# Patient Record
Sex: FEMALE | Race: WHITE | URBAN - METROPOLITAN AREA
[De-identification: names, ages, dates, MRNs, and addresses within clinical notes are randomized per-mention and may not be internally consistent; named-entity substitution may affect disease eponyms.]

---

## 2017-08-03 ENCOUNTER — OFFICE VISIT (OUTPATIENT)
Dept: FAMILY MEDICINE CLINIC | Age: 32
End: 2017-08-03

## 2017-08-03 VITALS
OXYGEN SATURATION: 96 % | HEIGHT: 64 IN | WEIGHT: 136 LBS | DIASTOLIC BLOOD PRESSURE: 80 MMHG | BODY MASS INDEX: 23.22 KG/M2 | RESPIRATION RATE: 20 BRPM | HEART RATE: 100 BPM | SYSTOLIC BLOOD PRESSURE: 130 MMHG | TEMPERATURE: 97.8 F

## 2017-08-03 DIAGNOSIS — Z11.3 SCREENING FOR STD (SEXUALLY TRANSMITTED DISEASE): ICD-10-CM

## 2017-08-03 DIAGNOSIS — R06.2 WHEEZING: ICD-10-CM

## 2017-08-03 DIAGNOSIS — Z00.00 ROUTINE ADULT HEALTH MAINTENANCE: ICD-10-CM

## 2017-08-03 DIAGNOSIS — Z30.41 ORAL CONTRACEPTIVE PILL SURVEILLANCE: Primary | ICD-10-CM

## 2017-08-03 RX ORDER — DESOGESTREL AND ETHINYL ESTRADIOL 0.15-0.03
KIT ORAL
COMMUNITY
Start: 2017-06-06 | End: 2017-08-08

## 2017-08-03 RX ORDER — ALBUTEROL SULFATE 90 UG/1
AEROSOL, METERED RESPIRATORY (INHALATION)
COMMUNITY
End: 2017-08-03 | Stop reason: SDUPTHER

## 2017-08-03 RX ORDER — TRETIONIN 0.1 MG/G
GEL TOPICAL
COMMUNITY
End: 2018-08-13 | Stop reason: SDUPTHER

## 2017-08-03 RX ORDER — ALBUTEROL SULFATE 90 UG/1
2 AEROSOL, METERED RESPIRATORY (INHALATION)
Qty: 1 INHALER | Refills: 1 | Status: SHIPPED | OUTPATIENT
Start: 2017-08-03 | End: 2018-08-13 | Stop reason: SDUPTHER

## 2017-08-03 NOTE — PROGRESS NOTES
ANNUAL PHYSICAL EXAMINATION    History of Present Illness  Prince Singh is a 28 y.o. female who presents today for management of    Chief Complaint   Patient presents with   [facility] Establish Care       Patient is here to establish care. She has takes tretinoin for acne. She takes OCP. She has been taking since she was 17. She denies any adverse effects and would like to continue with it. She has on and off wheezing that occurs randomly. PFTs normal. She takes albuterol as needed. LMP: 7/12/17    Past Medical History  Past Medical History:   Diagnosis Date    Acne     Wheezing         Surgical History  Past Surgical History:   Procedure Laterality Date    HX TONSILLECTOMY      HX WISDOM TEETH EXTRACTION          Current Medications  Current Outpatient Prescriptions   Medication Sig    JULEBER 0.15-0.03 mg tab     tretinoin (RETIN-A) 0.01 % topical gel Apply  to affected area nightly.  albuterol (PROVENTIL HFA, VENTOLIN HFA, PROAIR HFA) 90 mcg/actuation inhaler Take 2 Puffs by inhalation every four (4) hours as needed for Wheezing. No current facility-administered medications for this visit. Allergies/Drug Reactions  Allergies   Allergen Reactions    Percocet [Oxycodone-Acetaminophen] Nausea and Vomiting        Family History  Family History   Problem Relation Age of Onset    Asthma Mother     Asthma Brother     Diabetes Father     Heart Attack Father 39    Alzheimer Father     Alzheimer Paternal Grandfather         Social History  Social History     Social History    Marital status: UNKNOWN     Spouse name: N/A    Number of children: N/A    Years of education: N/A     Occupational History    Not on file.      Social History Main Topics    Smoking status: Never Smoker    Smokeless tobacco: Never Used    Alcohol use 2.4 oz/week     4 Glasses of wine per week    Drug use: No    Sexual activity: Yes     Partners: Male     Birth control/ protection: Pill     Other Topics Concern  Not on file     Social History Narrative    Works as a . Single       Health Maintenance   Topic Date Due    DTaP/Tdap/Td series (1 - Tdap) 09/25/2007    PAP AKA CERVICAL CYTOLOGY  09/25/2007    INFLUENZA AGE 9 TO ADULT  08/01/2017       There is no immunization history on file for this patient.     Review of Systems  General ROS: negative for - chills, fatigue or fever  Psychological ROS: negative for - anxiety or depression  Ophthalmic ROS: negative for - blurry vision, decreased vision or double vision  ENT ROS: negative  Allergy and Immunology ROS: negative  Hematological and Lymphatic ROS: negative  Endocrine ROS: negative  Respiratory ROS: no cough, shortness of breath, or wheezing  Cardiovascular ROS: no chest pain or dyspnea on exertion  Gastrointestinal ROS: no abdominal pain, change in bowel habits, or black or bloody stools  Genito-Urinary ROS: no dysuria, trouble voiding, or hematuria  Musculoskeletal ROS: negative  Neurological ROS: negative  Dermatological ROS: negative     Hearing Screening    125Hz 250Hz 500Hz 1000Hz 2000Hz 3000Hz 4000Hz 6000Hz 8000Hz   Right ear:   Pass Pass Pass  Pass     Left ear:   Pass Pass Pass  Pass        Visual Acuity Screening    Right eye Left eye Both eyes   Without correction: 20/15 20/20 20/13   With correction:          Physical Exam  Vital signs:   Vitals:    08/03/17 1132   BP: 130/80   Pulse: 100   Resp: 20   Temp: 97.8 °F (36.6 °C)   TempSrc: Oral   SpO2: 96%   Weight: 136 lb (61.7 kg)   Height: 5' 4\" (1.626 m)       General: alert, oriented, not in distress  Head: scalp normal, atraumatic  Eyes: pupils are equal and reactive, full and intact EOM's  Ears: patent ear canal, intact tympanic membrane  Nose: normal turbinates, no congestion or discharge  Lips/Mouth: moist lips and buccal mucosa, non-enlarged tonsils, pink throat  Neck: supple, no JVD, no lymphadenopathy, non-palpable thyroid  Chest/Lungs: clear breath sounds, no wheezing or crackles  Heart: normal rate, regular rhythm, no murmur  Abdomen: soft, non-distended, non-tender, normal bowel sounds, no organomegaly, no masses  Extremities: no focal deformities, no edema  Skin: no active skin lesions    Laboratory/Tests: Total cholesterol 214  HDL 67  Hab1c 4.7      Assessment/Plan:      ICD-10-CM ICD-9-CM    1. Oral contraceptive pill surveillance Z30.41 V25.41 CANCELED: AMB POC URINE PREGNANCY TEST, VISUAL COLOR COMPARISON   2. Wheezing R06.2 786.07 albuterol (PROVENTIL HFA, VENTOLIN HFA, PROAIR HFA) 90 mcg/actuation inhaler   3. Screening for STD (sexually transmitted disease) Z11.3 V74.5 HIV 1/2 AG/AB, 4TH GENERATION,W RFLX CONFIRM      HEPATITIS C AB      HSV 1 AND 2 ABS, IGM      T PALLIDUM SCREEN W/REFLEX   4. Routine adult health maintenance Z00.00 V70.0 CBC WITH AUTOMATED DIFF      METABOLIC PANEL, COMPREHENSIVE           Health Maintenance  Colon cancer: due at age 49  Dyslipidemia: will check FLP and CMP  Diabetes mellitus: will check FBG  Influenza vaccine: due in the fall  Pneumococcal vaccine: not indicated  Hep B vaccine: not indicated    Weight:  Body mass index is Estimated body mass index is 23.34 kg/(m^2) as calculated from the following:    Height as of this encounter: 5' 4\" (1.626 m). Weight as of this encounter: 136 lb (61.7 kg). .   Cervical cancer:  Pap smear due  Breast Cancer: Mammogram at age 37          Follow-up Disposition:  Return for well woman exam., OCP management      I have discussed the diagnosis with the patient and the intended plan as seen in the above orders. The patient has received an after-visit summary and questions were answered concerning future plans. I have discussed medication side effects and warnings with the patient as well. I have reviewed the plan of care with the patient, accepted their input and they are in agreement with the treatment goals.        Regina Goode MD  August 3, 2017

## 2017-08-03 NOTE — PROGRESS NOTES
1. Have you been to the ER, urgent care clinic since your last visit? Hospitalized since your last visit? No    2. Have you seen or consulted any other health care providers outside of the [facility] since your last visit? Include any pap smears or colon screening.  No

## 2017-08-03 NOTE — MR AVS SNAPSHOT
Visit Information Date & Time Provider Department Dept. Phone Encounter #  
 8/3/2017 11:15 AM Kimberly NadineRanda [phone] [id number] Follow-up Instructions Return for well woman exam. Upcoming Health Maintenance Date Due DTaP/Tdap/Td series (1 - Tdap) 9/25/2007 PAP AKA CERVICAL CYTOLOGY 9/25/2007 INFLUENZA AGE 9 TO ADULT 8/1/2017 Allergies as of 8/3/2017  Review Complete On: 8/3/2017 By: Kimberly Mccoy MD  
  
 Severity Noted Reaction Type Reactions Percocet [Oxycodone-acetaminophen]  08/03/2017    Nausea and Vomiting Current Immunizations  Never Reviewed No immunizations on file. Not reviewed this visit You Were Diagnosed With   
  
 Codes Comments Oral contraceptive pill surveillance    -  Primary ICD-10-CM: Z30.41 ICD-9-CM: V25.41 Wheezing     ICD-10-CM: R06.2 ICD-9-CM: 786.07 Screening for STD (sexually transmitted disease)     ICD-10-CM: Z11.3 ICD-9-CM: V74.5 Routine adult health maintenance     ICD-10-CM: Z00.00 ICD-9-CM: V70.0 Vitals BP Pulse Temp Resp Height(growth percentile) Weight(growth percentile) 130/80 100 97.8 °F (36.6 °C) (Oral) 20 5' 4\" (1.626 m) 136 lb (61.7 kg) LMP SpO2 BMI OB Status Smoking Status 07/12/2017 96% 23.34 kg/m2 Having regular periods Never Smoker BMI and BSA Data Body Mass Index Body Surface Area  
 23.34 kg/m 2 1.67 m 2 Preferred Pharmacy Pharmacy Name Phone [address] [phone] Your Updated Medication List  
  
   
This list is accurate as of: 8/3/17 11:59 AM.  Always use your most recent med list.  
  
  
  
  
 albuterol 90 mcg/actuation inhaler Commonly known as:  PROVENTIL HFA, VENTOLIN HFA, PROAIR HFA Take 2 Puffs by inhalation every four (4) hours as needed for Wheezing. JULEBER 0.15-0.03 mg Tab Generic drug:  desogestrel-ethinyl estradiol  
  
 tretinoin 0.01 % topical gel Commonly known as:  RETIN-A Apply  to affected area nightly. Prescriptions Sent to Pharmacy Refills  
 albuterol (PROVENTIL HFA, VENTOLIN HFA, PROAIR HFA) 90 mcg/actuation inhaler 1 Sig: Take 2 Puffs by inhalation every four (4) hours as needed for Wheezing. Class: Normal  
 [address] [place] [address] #: [phone] Route: Inhalation We Performed the Following AMB POC URINE PREGNANCY TEST, VISUAL COLOR COMPARISON [20048 CPT(R)] Follow-up Instructions Return for well woman exam. To-Do List   
 08/03/2017 Lab:  CBC WITH AUTOMATED DIFF   
  
 08/03/2017 Lab:  HEPATITIS C AB   
  
 08/03/2017 Lab:  HIV 1/2 AG/AB, 4TH GENERATION,W RFLX CONFIRM   
  
 08/03/2017 Lab:  HSV 1 AND 2 ABS, IGM   
  
 08/03/2017 Lab:  METABOLIC PANEL, COMPREHENSIVE   
  
 08/03/2017 Lab:  T PALLIDUM SCREEN W/REFLEX Introducing [facility]! Kiana Cowart introduces [employer] patient portal. Now you can access parts of your medical record, email your doctor's office, and request medication refills online. 1. In your internet browser, go to [email or web address] 2. Click on the First Time User? Click Here link in the Sign In box. You will see the New Member Sign Up page. 3. Enter your [employer] Access Code exactly as it appears below. You will not need to use this code after youve completed the sign-up process. If you do not sign up before the expiration date, you must request a new code. · [employer] Access Code: [email or web address] Expires: 11/1/2017 11:06 AM 
 
4. Enter the last four digits of your Social Security Number (xxxx) and Date of Birth (mm/dd/yyyy) as indicated and click Submit. You will be taken to the next sign-up page. 5. Create a [employer] ID.  This will be your [employer] login ID and cannot be changed, so think of one that is secure and easy to remember. 6. Create a [employer] password. You can change your password at any time. 7. Enter your Password Reset Question and Answer. This can be used at a later time if you forget your password. 8. Enter your e-mail address. You will receive e-mail notification when new information is available in [address] 9. Click Sign Up. You can now view and download portions of your medical record. 10. Click the Download Summary menu link to download a portable copy of your medical information. If you have questions, please visit the Frequently Asked Questions section of the [employer] website. Remember, [employer] is NOT to be used for urgent needs. For medical emergencies, dial 911. Now available from your iPhone and Android! Please provide this summary of care documentation to your next provider. Your primary care clinician is listed as Tracey John. If you have any questions after today's visit, please call [phone].

## 2017-08-08 ENCOUNTER — OFFICE VISIT (OUTPATIENT)
Dept: FAMILY MEDICINE CLINIC | Age: 32
End: 2017-08-08

## 2017-08-08 VITALS
WEIGHT: 136.4 LBS | HEIGHT: 64 IN | HEART RATE: 90 BPM | TEMPERATURE: 98 F | SYSTOLIC BLOOD PRESSURE: 140 MMHG | DIASTOLIC BLOOD PRESSURE: 87 MMHG | RESPIRATION RATE: 20 BRPM | OXYGEN SATURATION: 99 % | BODY MASS INDEX: 23.29 KG/M2

## 2017-08-08 DIAGNOSIS — Z30.41 ORAL CONTRACEPTIVE PILL SURVEILLANCE: Primary | ICD-10-CM

## 2017-08-08 DIAGNOSIS — Z01.419 WELL WOMAN EXAM WITH ROUTINE GYNECOLOGICAL EXAM: ICD-10-CM

## 2017-08-08 LAB
HCG URINE, QL. (POC): NEGATIVE
VALID INTERNAL CONTROL?: YES

## 2017-08-08 RX ORDER — DESOGESTREL AND ETHINYL ESTRADIOL 0.15-0.03
1 KIT ORAL DAILY
Qty: 3 DOSE PACK | Refills: 3 | Status: SHIPPED | OUTPATIENT
Start: 2017-08-08 | End: 2018-08-13 | Stop reason: SDUPTHER

## 2017-08-08 NOTE — PROGRESS NOTES
1. Have you been to the ER, urgent care clinic since your last visit? Hospitalized since your last visit? No    2. Have you seen or consulted any other health care providers outside of the [address] [place] [address] since your last visit? Include any pap smears or colon screening.  No

## 2017-08-08 NOTE — PATIENT INSTRUCTIONS
Learning About Pap Tests  What is a Pap test?  The Pap test (also called a Pap smear) is a screening test for cancer of the cervix, which is the lower part of the uterus that opens into the vagina. The test can help your doctor find early changes in the cells that could lead to cancer. During the test, the doctor or nurse will insert a tool called a speculum into your vagina. The speculum gently spreads apart the vaginal walls. That allows your doctor to see inside the vagina and the cervix. He or she uses a cotton swab or brush to collect cell samples from your cervix. Try to schedule the test when you're not having your period. To get ready for a Pap test, avoid douches, tampons, vaginal medicines, sprays, or powders for at least a day before you have the test.  When should you have a Pap test?  Women should start having Pap tests at age 24. If you are younger than 24 and are sexually active, it's still a good idea to have regular testing for sexually transmitted infections. · Women 21 to 30 should have Pap tests every 3 years. · Women 30 to 72 may continue to have a Pap test every 3 years as long as their results are normal. Or they can choose to have a Pap test and an HPV test. This is called co-testing. With co-testing, women can have screening every 5 years as long as their test results are normal.  · Women 72 and older may no longer need Pap tests. When to stop having Pap tests depends on your medical history, your overall health, and your risk of cervical cell changes or cervical cancer. Talk with your doctor about whether you should stop or continue to have Pap tests. He or she can help you decide. These recommendations do not apply to women who have had a serious abnormal Pap test result or who have certain health problems. Talk to your doctor about how often you should be tested. There is also a newer test called a primary HPV test. It is used in women ages 22 and older.  And it is done every 3 years, as long as a woman's test results are normal. A woman who has this test doesn't need to have a Pap test.  Having the HPV vaccine does not change your need for screening tests. Women who have had the HPV vaccine should follow the same screening schedules as women who have not had the vaccine. What happens after the test?  The sample of cells taken during your test will be sent to a lab so that an expert can look at the cells. It usually takes a week or two to get the results back. · A normal result means that the test did not find any abnormal cells in the sample. · An abnormal result can mean many things. Most of these are not cancer. The results of your test may be abnormal because:  ¨ You have an infection of the vagina or cervix, such as a yeast infection. ¨ You have an IUD (intrauterine device for birth control). ¨ You have low estrogen levels after menopause that are causing the cells to change. ¨ You have cell changes that may be a sign of precancer or cancer. The results are ranked based on how serious the changes might be. Where can you learn more? Go to http://alma-true.info/. Enter P919 in the search box to learn more about \"Learning About Pap Tests. \"  Current as of: July 26, 2016  Content Version: 11.3  © 5518-3682 [employer] Incorporated. Care instructions adapted under license by [employer] (which disclaims liability or warranty for this information). If you have questions about a medical condition or this instruction, always ask your healthcare professional. [place] [address] any warranty or liability for your use of this information.

## 2017-08-08 NOTE — MR AVS SNAPSHOT
Visit Information Date & Time Provider Department Dept. Phone Encounter #  
 8/8/2017  3:30 PM Elle Barrios, [address] [id number] Follow-up Instructions Return in about 1 year (around 8/8/2018), or as needed, for CPE . Upcoming Health Maintenance Date Due DTaP/Tdap/Td series (1 - Tdap) 9/25/2007 PAP AKA CERVICAL CYTOLOGY 9/25/2007 INFLUENZA AGE 9 TO ADULT 8/1/2017 Allergies as of 8/8/2017  Review Complete On: 8/8/2017 By: Elle Barrios MD  
  
 Severity Noted Reaction Type Reactions Percocet [Oxycodone-acetaminophen]  08/03/2017    Nausea and Vomiting Current Immunizations  Never Reviewed No immunizations on file. Not reviewed this visit You Were Diagnosed With   
  
 Codes Comments Oral contraceptive pill surveillance    -  Primary ICD-10-CM: Z30.41 ICD-9-CM: V25.41 Well woman exam with routine gynecological exam     ICD-10-CM: D14.825 ICD-9-CM: V72.31 Vitals BP Pulse Temp Resp Height(growth percentile) Weight(growth percentile) 140/87 90 98 °F (36.7 °C) (Oral) 20 5' 4\" (1.626 m) 136 lb 6.4 oz (61.9 kg) LMP SpO2 BMI OB Status Smoking Status 07/12/2017 99% 23.41 kg/m2 Having regular periods Never Smoker BMI and BSA Data Body Mass Index Body Surface Area  
 23.41 kg/m 2 1.67 m 2 [facility] [phone] Your Updated Medication List  
  
   
This list is accurate as of: 8/8/17  4:00 PM.  Always use your most recent med list.  
  
  
  
  
 albuterol 90 mcg/actuation inhaler Commonly known as:  PROVENTIL HFA, VENTOLIN HFA, PROAIR HFA Take 2 Puffs by inhalation every four (4) hours as needed for Wheezing. desogestrel-ethinyl estradiol 0.15-0.03 mg Tab Commonly known as:  Heidi Cockayne Take 1 Tab by mouth daily. tretinoin 0.01 % topical gel Commonly known as:  RETIN-A Apply  to affected area nightly. Prescriptions Sent to Pharmacy Refills  
 desogestrel-ethinyl estradiol (JULEBER) 0.15-0.03 mg tab 3 Sig: Take 1 Tab by mouth daily. Class: Normal  
 [facility] [address]  #: [phone] Route: Oral  
  
We Performed the Following AMB POC URINE PREGNANCY TEST, VISUAL COLOR COMPARISON [11228 CPT(R)] Follow-up Instructions Return in about 1 year (around 8/8/2018), or as needed, for CPE . To-Do List   
 08/08/2017 Pathology:  PAP IG, APTIMA HPV AND RFX 16/18,45 ([id number]) Patient Instructions Learning About Pap Tests What is a Pap test? 
The Pap test (also called a Pap smear) is a screening test for cancer of the cervix, which is the lower part of the uterus that opens into the vagina. The test can help your doctor find early changes in the cells that could lead to cancer. During the test, the doctor or nurse will insert a tool called a speculum into your vagina. The speculum gently spreads apart the vaginal walls. That allows your doctor to see inside the vagina and the cervix. He or she uses a cotton swab or brush to collect cell samples from your cervix. Try to schedule the test when you're not having your period. To get ready for a Pap test, avoid douches, tampons, vaginal medicines, sprays, or powders for at least a day before you have the test. 
When should you have a Pap test? 
Women should start having Pap tests at age 24. If you are younger than 24 and are sexually active, it's still a good idea to have regular testing for sexually transmitted infections. · Women 21 to 30 should have Pap tests every 3 years.  
· Women 30 to 72 may continue to have a Pap test every 3 years as long as their results are normal. Or they can choose to have a Pap test and an HPV test. This is called co-testing. With co-testing, women can have screening every 5 years as long as their test results are normal. 
· Women 72 and older may no longer need Pap tests. When to stop having Pap tests depends on your medical history, your overall health, and your risk of cervical cell changes or cervical cancer. Talk with your doctor about whether you should stop or continue to have Pap tests. He or she can help you decide. These recommendations do not apply to women who have had a serious abnormal Pap test result or who have certain health problems. Talk to your doctor about how often you should be tested. There is also a newer test called a primary HPV test. It is used in women ages 22 and older. And it is done every 3 years, as long as a woman's test results are normal. A woman who has this test doesn't need to have a Pap test. 
Having the HPV vaccine does not change your need for screening tests. Women who have had the HPV vaccine should follow the same screening schedules as women who have not had the vaccine. What happens after the test? 
The sample of cells taken during your test will be sent to a lab so that an expert can look at the cells. It usually takes a week or two to get the results back. · A normal result means that the test did not find any abnormal cells in the sample. · An abnormal result can mean many things. Most of these are not cancer. The results of your test may be abnormal because: 
¨ You have an infection of the vagina or cervix, such as a yeast infection. ¨ You have an IUD (intrauterine device for birth control). ¨ You have low estrogen levels after menopause that are causing the cells to change. ¨ You have cell changes that may be a sign of precancer or cancer. The results are ranked based on how serious the changes might be. Where can you learn more? Go to http://alma-true.info/. Enter P919 in the search box to learn more about \"Learning About Pap Tests. \" Current as of: July 26, 2016 Content Version: 11.3 © 1091-7161 [employer]orporated. Care instructions adapted under license by [employer] (which disclaims liability or warranty for this information). If you have questions about a medical condition or this instruction, always ask your healthcare professional. [address] any warranty or liability for your use of this information. Introducing [facility]! [employer] introduces [employer] patient portal. Now you can access parts of your medical record, email your doctor's office, and request medication refills online. 1. In your internet browser, go to [email or web address] 2. Click on the First Time User? Click Here link in the Sign In box. You will see the New Member Sign Up page. 3. Enter your [employer] Access Code exactly as it appears below. You will not need to use this code after youve completed the sign-up process. If you do not sign up before the expiration date, you must request a new code. · [employer] Access Code: [email or web address] Expires: 11/1/2017 11:06 AM 
 
4. Enter the last four digits of your Social Security Number (xxxx) and Date of Birth (mm/dd/yyyy) as indicated and click Submit. You will be taken to the next sign-up page. 5. Create a [employer] ID. This will be your [employer] login ID and cannot be changed, so think of one that is secure and easy to remember. 6. Create a [employer] password. You can change your password at any time. 7. Enter your Password Reset Question and Answer. This can be used at a later time if you forget your password. 8. Enter your e-mail address. You will receive e-mail notification when new information is available in [address] 9. Click Sign Up. You can now view and download portions of your medical record. 10. Click the Download Summary menu link to download a portable copy of your medical information. If you have questions, please visit the Frequently Asked Questions section of the [employer] website. Remember, [employer] is NOT to be used for urgent needs. For medical emergencies, dial 911. Now available from your iPhone and Android! Please provide this summary of care documentation to your next provider. Your primary care clinician is listed as Lisette Cabrales. If you have any questions after today's visit, please call [phone].

## 2017-08-08 NOTE — PROGRESS NOTES
History of Present Illness  Bright Strange is a 28 y.o. female who presents today for management of    Chief Complaint   Patient presents with    Well Woman    Contraception       Last LMP: 7/12/17  Patient denies any abnormal vaginal discharge, pain or bleeding. She is  sexually active with 1 partners. Last pap smear: 3 years ago  History of abnormal pap smear: yes, but last 2 pap smears normal    Problem List  Patient Active Problem List    Diagnosis Date Noted    Oral contraceptive pill surveillance 08/03/2017    Wheezing 08/03/2017       Past Medical History  Past Medical History:   Diagnosis Date    Acne     Wheezing         Surgical History  Past Surgical History:   Procedure Laterality Date    HX TONSILLECTOMY      HX WISDOM TEETH EXTRACTION          Current Medications  Current Outpatient Prescriptions   Medication Sig    desogestrel-ethinyl estradiol ([place]) 0.15-0.03 mg tab Take 1 Tab by mouth daily.  tretinoin (RETIN-A) 0.01 % topical gel Apply  to affected area nightly.  albuterol (PROVENTIL HFA, VENTOLIN HFA, PROAIR HFA) 90 mcg/actuation inhaler Take 2 Puffs by inhalation every four (4) hours as needed for Wheezing. No current facility-administered medications for this visit. Allergies/Drug Reactions  Allergies   Allergen Reactions    Percocet [Oxycodone-Acetaminophen] Nausea and Vomiting        Family History  Family History   Problem Relation Age of Onset    Asthma Mother     Asthma Brother     Diabetes Father     Heart Attack Father 39    Alzheimer Father     Alzheimer Paternal Grandfather         Social History  Social History     Social History    Marital status: UNKNOWN     Spouse name: N/A    Number of children: N/A    Years of education: N/A     Occupational History    Not on file.      Social History Main Topics    Smoking status: Never Smoker    Smokeless tobacco: Never Used    Alcohol use 2.4 oz/week     4 Glasses of wine per week    Drug use: No    Sexual activity: Yes     Partners: Male     Birth control/ protection: Pill     Other Topics Concern    Not on file     Social History Narrative    Works as a . Single       Review of Systems  General ROS: negative for - chills, fatigue or fever  Genito-Urinary ROS: negative for - change in menstrual cycle, dysmenorrhea, dyspareunia, irregular/heavy menses, pelvic pain or vulvar/vaginal symptoms      Physical Exam  Vital signs:   Vitals:    08/08/17 1540   BP: 140/87   Pulse: 90   Resp: 20   Temp: 98 °F (36.7 °C)   TempSrc: Oral   SpO2: 99%   Weight: 136 lb 6.4 oz (61.9 kg)   Height: 5' 4\" (1.626 m)       General: alert, oriented, not in distress  Breasts: right breast normal without mass, skin or nipple changes or axillary nodes, left breast normal without mass, skin or nipple changes or axillary nodes  Pelvic exam: VULVA: normal appearing vulva with no masses, tenderness or lesions, VAGINA: normal appearing vagina with normal color and discharge, no lesions, CERVIX: normal appearing cervix without discharge or lesions, UTERUS: uterus is normal size, shape, consistency and nontender, ADNEXA: nontender and no masses, BLADDER: nontender to palpation, no masses, URETHRAL MEATUS: no erythema or lesions present, PERINEUM: no evidence of trauma, no rashes or skin lesions present, ANUS: within normal limits, no hemorrhoids present      Assessment/Plan:          ICD-10-CM ICD-9-CM    1. Oral contraceptive pill surveillance Z30.41 V25.41 desogestrel-ethinyl estradiol (JULEBER) 0.15-0.03 mg tab      AMB POC URINE PREGNANCY TEST, VISUAL COLOR COMPARISON   2. Well woman exam with routine gynecological exam Z01.419 V72.31 PAP IG, APTIMA HPV AND RFX 16/18,45 ([id number])           Follow-up Disposition:  Return in about 1 year (around 8/8/2018), or as needed, for CPE . I have discussed the diagnosis with the patient and the intended plan as seen in the above orders.   The patient has received an after-visit summary and questions were answered concerning future plans. I have discussed medication side effects and warnings with the patient as well. I have reviewed the plan of care with the patient, accepted their input and they are in agreement with the treatment goals.        Kim Merrill MD  August 8, 2017

## 2017-08-09 LAB — PAP IMAGE GUIDED, 8900296: NORMAL

## 2017-08-15 LAB
MISCELLANEOUS TEST,99000: NORMAL
SENT TO, 434: NORMAL
TEST NAME, 435: NORMAL

## 2018-08-13 ENCOUNTER — OFFICE VISIT (OUTPATIENT)
Dept: FAMILY MEDICINE CLINIC | Age: 33
End: 2018-08-13

## 2018-08-13 VITALS
BODY MASS INDEX: 24.24 KG/M2 | SYSTOLIC BLOOD PRESSURE: 119 MMHG | TEMPERATURE: 99.5 F | HEART RATE: 96 BPM | OXYGEN SATURATION: 99 % | RESPIRATION RATE: 16 BRPM | DIASTOLIC BLOOD PRESSURE: 75 MMHG | WEIGHT: 142 LBS | HEIGHT: 64 IN

## 2018-08-13 DIAGNOSIS — R06.2 WHEEZING: ICD-10-CM

## 2018-08-13 DIAGNOSIS — Z00.00 ROUTINE GENERAL MEDICAL EXAMINATION AT A HEALTH CARE FACILITY: Primary | ICD-10-CM

## 2018-08-13 DIAGNOSIS — Z30.41 ORAL CONTRACEPTIVE PILL SURVEILLANCE: ICD-10-CM

## 2018-08-13 DIAGNOSIS — L70.0 ACNE VULGARIS: ICD-10-CM

## 2018-08-13 LAB
HCG URINE, QL. (POC): NEGATIVE
VALID INTERNAL CONTROL?: YES

## 2018-08-13 RX ORDER — TRETIONIN 0.1 MG/G
GEL TOPICAL
Qty: 45 G | Refills: 1 | Status: SHIPPED | OUTPATIENT
Start: 2018-08-13

## 2018-08-13 RX ORDER — DESOGESTREL AND ETHINYL ESTRADIOL 0.15-0.03
1 KIT ORAL DAILY
Qty: 3 DOSE PACK | Refills: 3 | Status: SHIPPED | OUTPATIENT
Start: 2018-08-13 | End: 2019-06-20 | Stop reason: SDUPTHER

## 2018-08-13 RX ORDER — ALBUTEROL SULFATE 90 UG/1
2 AEROSOL, METERED RESPIRATORY (INHALATION)
Qty: 1 INHALER | Refills: 1 | Status: SHIPPED | OUTPATIENT
Start: 2018-08-13 | End: 2020-06-29 | Stop reason: SDUPTHER

## 2018-08-13 NOTE — PROGRESS NOTES
Chief Complaint   Patient presents with    Well Woman     no pap     Medication Refill     1. Have you been to the ER, urgent care clinic since your last visit? Hospitalized since your last visit? No    2. Have you seen or consulted any other health care providers outside of the [address] [place] [address] since your last visit? Include any pap smears or colon screening.  No

## 2018-08-13 NOTE — MR AVS SNAPSHOT
[address] [place] [address] [phone] 
[phone] Patient: Patrizia Celestin 
MRN: [id number] [facility]:0/84/4703 Visit Information Date & Time Provider Department Dept. Phone Encounter #  
 8/13/2018 12:30 PM Patricia Plummer, [facility] [phone] [id number] Follow-up Instructions Return in about 1 year (around 8/13/2019), or as needed, for CPE. Upcoming Health Maintenance Date Due DTaP/Tdap/Td series (1 - Tdap) 9/25/2007 Influenza Age 5 to Adult 8/1/2018 PAP AKA CERVICAL CYTOLOGY 8/8/2020 Allergies as of 8/13/2018  Review Complete On: 8/13/2018 By: Patricia Plummer MD  
  
 Severity Noted Reaction Type Reactions Percocet [Oxycodone-acetaminophen]  08/03/2017    Nausea and Vomiting Current Immunizations  Never Reviewed No immunizations on file. Not reviewed this visit You Were Diagnosed With   
  
 Codes Comments Routine general medical examination at a health care facility    -  Primary ICD-10-CM: Z00.00 ICD-9-CM: V70.0 Oral contraceptive pill surveillance     ICD-10-CM: Z30.41 ICD-9-CM: V25.41 Wheezing     ICD-10-CM: R06.2 ICD-9-CM: 786.07 Acne vulgaris     ICD-10-CM: L70.0 ICD-9-CM: 706.1 Vitals BP Pulse Temp Resp Height(growth percentile) Weight(growth percentile) 119/75 (BP 1 Location: Right arm, BP Patient Position: At rest) 96 99.5 °F (37.5 °C) (Oral) 16 5' 4\" (1.626 m) 142 lb (64.4 kg) LMP SpO2 BMI OB Status Smoking Status 08/07/2018 99% 24.37 kg/m2 Having regular periods Never Smoker Vitals History BMI and BSA Data Body Mass Index Body Surface Area  
 24.37 kg/m 2 1.71 m 2 [facility] [phone] Your Updated Medication List  
  
   
 This list is accurate as of 8/13/18 12:42 PM.  Always use your most recent med list.  
  
  
  
  
 albuterol 90 mcg/actuation inhaler Commonly known as:  PROVENTIL HFA, VENTOLIN HFA, PROAIR HFA Take 2 Puffs by inhalation every four (4) hours as needed for Wheezing. desogestrel-ethinyl estradiol 0.15-0.03 mg Tab Commonly known as:  Margarie Muta Take 1 Tab by mouth daily. tretinoin 0.01 % topical gel Commonly known as:  RETIN-A Apply  to affected area nightly. Prescriptions Sent to Pharmacy Refills  
 desogestrel-ethinyl estradiol (JULEBER) 0.15-0.03 mg tab 3 Sig: Take 1 Tab by mouth daily. Class: Normal  
 Pharmacy: [place] [address] Ph #: [phone] Route: Oral  
 albuterol (PROVENTIL HFA, VENTOLIN HFA, PROAIR HFA) 90 mcg/actuation inhaler 1 Sig: Take 2 Puffs by inhalation every four (4) hours as needed for Wheezing. Class: Normal  
 Pharmacy: [place] [address] Ph #: [phone] Route: Inhalation  
 tretinoin (RETIN-A) 0.01 % topical gel 1 Sig: Apply  to affected area nightly. Class: Normal  
 Pharmacy: [place] [address] Ph #: [phone] Route: Topical  
  
We Performed the Following AMB POC URINE PREGNANCY TEST, VISUAL COLOR COMPARISON [12156 CPT(R)] Follow-up Instructions Return in about 1 year (around 8/13/2019), or as needed, for CPE. To-Do List   
 08/13/2018 Lab:  CBC WITH AUTOMATED DIFF   
  
 08/13/2018 Lab:  HEMOGLOBIN A1C WITH EAG   
  
 08/13/2018 Lab:  LIPID PANEL   
  
 08/13/2018 Lab:  METABOLIC PANEL, COMPREHENSIVE Around 08/13/2018 Lab:  URINALYSIS W/ RFLX MICROSCOPIC Introducing [facility]!    
 [address] introduces [employer] patient portal. Now you can access parts of your medical record, email your doctor's office, and request medication refills online. 1. In your internet browser, go to [email or web address] 2. Click on the First Time User? Click Here link in the Sign In box. You will see the New Member Sign Up page. 3. Enter your [employer] Access Code exactly as it appears below. You will not need to use this code after youve completed the sign-up process. If you do not sign up before the expiration date, you must request a new code. · [employer] Access Code: [email or web address] Expires: 11/11/2018 12:42 PM 
 
4. Enter the last four digits of your Social Security Number (xxxx) and Date of Birth (mm/dd/yyyy) as indicated and click Submit. You will be taken to the next sign-up page. 5. Create a [employer] ID. This will be your [employer] login ID and cannot be changed, so think of one that is secure and easy to remember. 6. Create a [employer] password. You can change your password at any time. 7. Enter your Password Reset Question and Answer. This can be used at a later time if you forget your password. 8. Enter your e-mail address. You will receive e-mail notification when new information is available in [address] 9. Click Sign Up. You can now view and download portions of your medical record. 10. Click the Download Summary menu link to download a portable copy of your medical information. If you have questions, please visit the Frequently Asked Questions section of the [employer] website. Remember, [employer] is NOT to be used for urgent needs. For medical emergencies, dial 911. Now available from your iPhone and Android! Please provide this summary of care documentation to your next provider. Your primary care clinician is listed as Rodney Mir. If you have any questions after today's visit, please call [phone].

## 2018-08-13 NOTE — PROGRESS NOTES
ANNUAL PHYSICAL EXAMINATION    History of Present Illness  Evan Brown is a 33 y.o. female who presents today for management of    Chief Complaint   Patient presents with    Well Woman     no pap     Medication Refill         Patient here for routine exam.  Current Complaints: needs refill of OCP. Gynecologic History  Patient's last menstrual period was Patient's last menstrual period was 2018. Oris Castellon Contraception: OCP (Oral Contraceptive Pills)  Last Pap: 2017  Results: normal    Obstetric History  : 0  Para: 0  AB: 0    Health Maintenance  Colon cancer: due at age 49  Dyslipidemia: due  Diabetes mellitus: due  Influenza vaccine: due in the fall  Pneumococcal vaccine: not indicated  Tdap: unknown  Herpes Zoster vaccine: due at age 62  Hep B vaccine: not indicated    Weight:  Body mass index is Estimated body mass index is Body mass index is 24.37 kg/(m^2). Oris Castellon Diet: no  Exercise: yes  Seatbelt: yes  Sunscreen: yes  Dentist: yes      Past Medical History  Past Medical History:   Diagnosis Date    Acne     Wheezing         Surgical History  Past Surgical History:   Procedure Laterality Date    HX TONSILLECTOMY      HX WISDOM TEETH EXTRACTION          Current Medications  Current Outpatient Prescriptions   Medication Sig    desogestrel-ethinyl estradiol (JULEBER) 0.15-0.03 mg tab Take 1 Tab by mouth daily.  albuterol (PROVENTIL HFA, VENTOLIN HFA, PROAIR HFA) 90 mcg/actuation inhaler Take 2 Puffs by inhalation every four (4) hours as needed for Wheezing.  tretinoin (RETIN-A) 0.01 % topical gel Apply  to affected area nightly. No current facility-administered medications for this visit.         Allergies/Drug Reactions  Allergies   Allergen Reactions    Percocet [Oxycodone-Acetaminophen] Nausea and Vomiting        Family History  Family History   Problem Relation Age of Onset    Asthma Mother     Asthma Brother     Diabetes Father     Heart Attack Father 39    Alzheimer Father     Alzheimer Paternal Grandfather         Social History  Social History     Social History    Marital status: SINGLE     Spouse name: N/A    Number of children: N/A    Years of education: N/A     Occupational History    Not on file. Social History Main Topics    Smoking status: Never Smoker    Smokeless tobacco: Never Used    Alcohol use 2.4 oz/week     4 Glasses of wine per week    Drug use: No    Sexual activity: Yes     Partners: Male     Birth control/ protection: Pill     Other Topics Concern    Not on file     Social History Narrative    Works as a . Single       Health Maintenance   Topic Date Due    DTaP/Tdap/Td series (1 - Tdap) 09/25/2007    Influenza Age 5 to Adult  08/01/2018    PAP AKA CERVICAL CYTOLOGY  08/08/2020       There is no immunization history on file for this patient.     Review of Systems  General ROS: negative for - chills, fatigue, fever, sleep disturbance, weight gain or weight loss  Psychological ROS: negative for - anxiety or depression  Ophthalmic ROS: negative  ENT ROS: negative  Allergy and Immunology ROS: negative  Hematological and Lymphatic ROS: negative  Endocrine ROS: negative for - breast changes, hair pattern changes or skin changes  Breast ROS: negative for breast lumps  Respiratory ROS: no cough, shortness of breath, or wheezing  Cardiovascular ROS: no chest pain or dyspnea on exertion  Gastrointestinal ROS: no abdominal pain, change in bowel habits, or black or bloody stools  Genito-Urinary ROS: no dysuria, trouble voiding, or hematuria  Musculoskeletal ROS: negative  Neurological ROS: negative for - confusion, dizziness, gait disturbance or impaired coordination/balance  Dermatological ROS: positive for - acne       Hearing Screening    125Hz 250Hz 500Hz 1000Hz 2000Hz 3000Hz 4000Hz 6000Hz 8000Hz   Right ear:   Pass Pass Pass  Pass     Left ear:   Pass Pass Pass  Pass        Visual Acuity Screening    Right eye Left eye Both eyes   Without correction: 20/13 20/13 20/13   With correction:            Physical Exam  Vital signs:   Vitals:    08/13/18 1224   BP: 119/75   Pulse: 96   Resp: 16   Temp: 99.5 °F (37.5 °C)   TempSrc: Oral   SpO2: 99%   Weight: 142 lb (64.4 kg)   Height: 5' 4\" (1.626 m)       General: alert, oriented, not in distress  Head: scalp normal, atraumatic  Eyes: pupils are equal and reactive, full and intact EOM's  Ears: patent ear canal, intact tympanic membrane  Nose: normal turbinates, no congestion or discharge  Lips/Mouth: moist lips and buccal mucosa, non-enlarged tonsils, pink throat  Neck: supple, no JVD, no lymphadenopathy, non-palpable thyroid  Chest/Lungs: clear breath sounds, no wheezing or crackles  Heart: normal rate, regular rhythm, no murmur  Abdomen: soft, non-distended, non-tender, normal bowel sounds, no organomegaly, no masses  Extremities: no focal deformities, no edema  Skin: no active skin lesions    Laboratory/Tests:    Routine labs ordered    Assessment/Plan:        ICD-10-CM ICD-9-CM    1. Routine general medical examination at a health care facility Z00.00 V70.0 CBC WITH AUTOMATED DIFF      HEMOGLOBIN A1C WITH EAG      LIPID PANEL      METABOLIC PANEL, COMPREHENSIVE      URINALYSIS W/ RFLX MICROSCOPIC      CBC WITH AUTOMATED DIFF      HEMOGLOBIN A1C WITH EAG      LIPID PANEL      METABOLIC PANEL, COMPREHENSIVE      URINALYSIS W/ RFLX MICROSCOPIC   2. Oral contraceptive pill surveillance Z30.41 V25.41 desogestrel-ethinyl estradiol (JULEBER) 0.15-0.03 mg tab      AMB POC URINE PREGNANCY TEST, VISUAL COLOR COMPARISON   3. Wheezing R06.2 786.07 albuterol (PROVENTIL HFA, VENTOLIN HFA, PROAIR HFA) 90 mcg/actuation inhaler   4. Acne vulgaris L70.0 706.1 tretinoin (RETIN-A) 0.01 % topical gel       continue current medications, continue current healthy lifestyle patterns and return for routine annual checkups  Insurance health assessment form will be completed for pick-up.     Follow-up Disposition:  Return in about 1 year (around 8/13/2019), or as needed, for CPE. I have discussed the diagnosis with the patient and the intended plan as seen in the above orders. The patient has received an after-visit summary and questions were answered concerning future plans. I have discussed medication side effects and warnings with the patient as well. I have reviewed the plan of care with the patient, accepted their input and they are in agreement with the treatment goals.        Marco A eBnitez MD  August 13, 2018

## 2018-08-14 ENCOUNTER — DOCUMENTATION ONLY (OUTPATIENT)
Dept: FAMILY MEDICINE CLINIC | Age: 33
End: 2018-08-14

## 2018-08-14 LAB
A-G RATIO,AGRAT: 1.6 RATIO (ref 1.1–2.6)
ABSOLUTE LYMPHOCYTE COUNT, 10803: 3.2 K/UL (ref 1–4.8)
ALBUMIN SERPL-MCNC: 4.3 G/DL (ref 3.5–5)
ALP SERPL-CCNC: 35 U/L (ref 25–115)
ALT SERPL-CCNC: 10 U/L (ref 5–40)
ANION GAP SERPL CALC-SCNC: 16 MMOL/L
AST SERPL W P-5'-P-CCNC: 13 U/L (ref 10–37)
AVG GLU, 10930: 83 MG/DL (ref 91–123)
BASOPHILS # BLD: 0 K/UL (ref 0–0.2)
BASOPHILS NFR BLD: 0 % (ref 0–2)
BILIRUB SERPL-MCNC: 0.5 MG/DL (ref 0.2–1.2)
BILIRUB UR QL: NEGATIVE
BUN SERPL-MCNC: 9 MG/DL (ref 6–22)
CALCIUM SERPL-MCNC: 9.2 MG/DL (ref 8.4–10.5)
CHLORIDE SERPL-SCNC: 99 MMOL/L (ref 98–110)
CHOLEST SERPL-MCNC: 200 MG/DL (ref 110–200)
CO2 SERPL-SCNC: 24 MMOL/L (ref 20–32)
CREAT SERPL-MCNC: 0.6 MG/DL (ref 0.5–1.2)
EOSINOPHIL # BLD: 0.3 K/UL (ref 0–0.5)
EOSINOPHIL NFR BLD: 3 % (ref 0–6)
ERYTHROCYTE [DISTWIDTH] IN BLOOD BY AUTOMATED COUNT: 13.4 % (ref 10–15.5)
GFRAA, 66117: >60
GFRNA, 66118: >60
GLOBULIN,GLOB: 2.7 G/DL (ref 2–4)
GLUCOSE SERPL-MCNC: 83 MG/DL (ref 70–99)
GLUCOSE UR QL: NEGATIVE MG/DL
GRANULOCYTES,GRANS: 48 % (ref 40–75)
HBA1C MFR BLD HPLC: 4.5 % (ref 4.8–5.9)
HCT VFR BLD AUTO: 42.5 % (ref 35.1–46.5)
HDLC SERPL-MCNC: 3.8 MG/DL (ref 0–5)
HDLC SERPL-MCNC: 53 MG/DL (ref 40–59)
HGB BLD-MCNC: 13.2 G/DL (ref 11.7–15.5)
HGB UR QL STRIP: NEGATIVE
KETONES UR QL STRIP.AUTO: NEGATIVE MG/DL
LDLC SERPL CALC-MCNC: 93 MG/DL (ref 50–99)
LEUKOCYTE ESTERASE: NEGATIVE
LYMPHOCYTES, LYMLT: 42 % (ref 20–45)
MCH RBC QN AUTO: 30 PG (ref 26–34)
MCHC RBC AUTO-ENTMCNC: 31 G/DL (ref 31–36)
MCV RBC AUTO: 97 FL (ref 80–95)
MONOCYTES # BLD: 0.4 K/UL (ref 0.1–1)
MONOCYTES NFR BLD: 6 % (ref 3–12)
NEUTROPHILS # BLD AUTO: 3.7 K/UL (ref 1.8–7.7)
NITRITE UR QL STRIP.AUTO: NEGATIVE
PH UR STRIP: 5 PH (ref 5–8)
PLATELET # BLD AUTO: 339 K/UL (ref 140–440)
PMV BLD AUTO: 10.5 FL (ref 9–13)
POTASSIUM SERPL-SCNC: 4.3 MMOL/L (ref 3.5–5.5)
PROT SERPL-MCNC: 7 G/DL (ref 6.4–8.3)
PROT UR QL STRIP: NEGATIVE MG/DL
RBC # BLD AUTO: 4.39 M/UL (ref 3.8–5.2)
SODIUM SERPL-SCNC: 139 MMOL/L (ref 133–145)
SP GR UR: 1.01 (ref 1–1.03)
TRIGL SERPL-MCNC: 275 MG/DL (ref 40–149)
UROBILINOGEN UR STRIP-MCNC: <2 MG/DL
VLDLC SERPL CALC-MCNC: 55 MG/DL (ref 8–30)
WBC # BLD AUTO: 7.6 K/UL (ref 4–11)

## 2018-08-14 NOTE — PROGRESS NOTES
[place] screening form completed. Please advise patient that it is ready for . Thank you.       Ana Valverde MD  August 14, 2018

## 2019-06-20 ENCOUNTER — OFFICE VISIT (OUTPATIENT)
Dept: FAMILY MEDICINE CLINIC | Age: 34
End: 2019-06-20

## 2019-06-20 VITALS
SYSTOLIC BLOOD PRESSURE: 115 MMHG | RESPIRATION RATE: 16 BRPM | HEIGHT: 64 IN | HEART RATE: 91 BPM | BODY MASS INDEX: 24.89 KG/M2 | WEIGHT: 145.8 LBS | TEMPERATURE: 98.8 F | OXYGEN SATURATION: 97 % | DIASTOLIC BLOOD PRESSURE: 72 MMHG

## 2019-06-20 DIAGNOSIS — Z30.41 ORAL CONTRACEPTIVE PILL SURVEILLANCE: ICD-10-CM

## 2019-06-20 DIAGNOSIS — Z30.09 ENCOUNTER FOR OTHER GENERAL COUNSELING OR ADVICE ON CONTRACEPTION: Primary | ICD-10-CM

## 2019-06-20 LAB
HCG QL BLOOD POCT, HCGQLPOCT: NORMAL
HCG URINE, QL. (POC): NEGATIVE
HCG URINE, QL. (POC): NORMAL
VALID INTERNAL CONTROL?: YES
VALID INTERNAL CONTROL?: YES

## 2019-06-20 RX ORDER — DESOGESTREL AND ETHINYL ESTRADIOL 0.15-0.03
1 KIT ORAL DAILY
Qty: 3 DOSE PACK | Refills: 3 | Status: SHIPPED | OUTPATIENT
Start: 2019-06-20 | End: 2020-06-15 | Stop reason: SDUPTHER

## 2019-06-20 NOTE — PROGRESS NOTES
Whit Harley is a 29 y.o. female (: 1985) presenting to address:    Chief Complaint   Patient presents with    Medication Refill       Medication list and allergies have been reviewed with Whit Harley and updated as of today's date. I have gone over all Medical, Surgical and Social History with Whit Harley and updated/added the information accordingly. 1. Have you been to the ER, Urgent Care or Hospitalized since your last visit? NO      2. Have you followed up with your PCP or any other Physicians since your procedure/ last office visit?    NO

## 2019-06-20 NOTE — PROGRESS NOTES
History of Present Illness  Fe Gloria is a 29 y.o. female who presents today for management of    Chief Complaint   Patient presents with    Medication Refill       History of Present Illness  Fe Gloria is a 29 y.o. female who presents for contraception counseling. The patient has no complaints today. The patient is sexually active. Social History: single partner, contraception - OCP (Oral Contraceptive Pills). Pertinent past medical hstory: no history of HTN, DVT, CAD, DM, liver disease, migraines or smoking. GYN review: normal menses, no abnormal bleeding, pelvic pain or discharge, no breast pain or new or enlarging lumps on self exam        Problem List  Patient Active Problem List    Diagnosis Date Noted    Oral contraceptive pill surveillance 08/03/2017    Wheezing 08/03/2017       Past Medical History  Past Medical History:   Diagnosis Date    Acne     Wheezing         Surgical History  Past Surgical History:   Procedure Laterality Date    HX TONSILLECTOMY      HX WISDOM TEETH EXTRACTION          Current Medications  Current Outpatient Medications   Medication Sig    desogestrel-ethinyl estradiol (JULEBER) 0.15-0.03 mg tab Take 1 Tab by mouth daily.  albuterol (PROVENTIL HFA, VENTOLIN HFA, PROAIR HFA) 90 mcg/actuation inhaler Take 2 Puffs by inhalation every four (4) hours as needed for Wheezing.  tretinoin (RETIN-A) 0.01 % topical gel Apply  to affected area nightly. No current facility-administered medications for this visit.         Allergies/Drug Reactions  Allergies   Allergen Reactions    Percocet [Oxycodone-Acetaminophen] Nausea and Vomiting    Vicodin [Hydrocodone-Acetaminophen] Nausea and Vomiting        Family History  Family History   Problem Relation Age of Onset    Asthma Mother     Asthma Brother     Diabetes Father     Heart Attack Father 39    Alzheimer Father     Alzheimer Paternal Grandfather         Social History  Social History Socioeconomic History    Marital status: SINGLE     Spouse name: Not on file    Number of children: Not on file    Years of education: Not on file    Highest education level: Not on file   Occupational History    Not on file   Social Needs    Financial resource strain: Not on file    Food insecurity:     Worry: Not on file     Inability: Not on file    Transportation needs:     Medical: Not on file     Non-medical: Not on file   Tobacco Use    Smoking status: Never Smoker    Smokeless tobacco: Never Used   Substance and Sexual Activity    Alcohol use: Yes     Alcohol/week: 2.4 oz     Types: 4 Glasses of wine per week    Drug use: No    Sexual activity: Yes     Partners: Male     Birth control/protection: Pill   Lifestyle    Physical activity:     Days per week: Not on file     Minutes per session: Not on file    Stress: Not on file   Relationships    Social connections:     Talks on phone: Not on file     Gets together: Not on file     Attends Nondenominational service: Not on file     Active member of club or organization: Not on file     Attends meetings of clubs or organizations: Not on file     Relationship status: Not on file    Intimate partner violence:     Fear of current or ex partner: Not on file     Emotionally abused: Not on file     Physically abused: Not on file     Forced sexual activity: Not on file   Other Topics Concern    Not on file   Social History Narrative    Works as a . Single       Review of Systems  Negative except as mentioned in HPI      Physical Exam  Vital signs:   Vitals:    06/20/19 1320   BP: 115/72   Pulse: 91   Resp: 16   Temp: 98.8 °F (37.1 °C)   SpO2: 97%   Weight: 145 lb 12.8 oz (66.1 kg)   Height: 5' 4\" (1.626 m)       General: alert, oriented, not in distress  Skin: no visible abnormalities      Assessment/Plan:      ICD-10-CM ICD-9-CM    1.  Encounter for other general counseling or advice on contraception Z30.09 V25.09 AMB POC GONADOTROPIN, CHORIONIC (HCG); QUALITATIVE   2. Oral contraceptive pill surveillance Z30.41 V25.41 desogestrel-ethinyl estradiol (JULEBER) 0.15-0.03 mg tab     continuing OCP (Oral Contraceptive Pills) and OCP (estrogen/progesterone), no contraindications. return annually or prn    Total visit time was 15 minutes of which more than 50% was devoted to counseling and coordination of care. I have discussed the diagnosis with the patient and the intended plan as seen in the above orders. The patient has received an after-visit summary and questions were answered concerning future plans. I have discussed medication side effects and warnings with the patient as well. I have reviewed the plan of care with the patient, accepted their input and they are in agreement with the treatment goals.        Rita Westfall MD  June 20, 2019

## 2020-06-29 ENCOUNTER — OFFICE VISIT (OUTPATIENT)
Dept: FAMILY MEDICINE CLINIC | Age: 35
End: 2020-06-29

## 2020-06-29 VITALS
HEIGHT: 64 IN | BODY MASS INDEX: 25.92 KG/M2 | OXYGEN SATURATION: 99 % | DIASTOLIC BLOOD PRESSURE: 84 MMHG | SYSTOLIC BLOOD PRESSURE: 132 MMHG | WEIGHT: 151.8 LBS | RESPIRATION RATE: 18 BRPM | HEART RATE: 103 BPM | TEMPERATURE: 97.1 F

## 2020-06-29 DIAGNOSIS — Z11.3 ROUTINE SCREENING FOR STI (SEXUALLY TRANSMITTED INFECTION): ICD-10-CM

## 2020-06-29 DIAGNOSIS — Z01.419 WELL WOMAN EXAM WITH ROUTINE GYNECOLOGICAL EXAM: ICD-10-CM

## 2020-06-29 DIAGNOSIS — R06.2 WHEEZING: ICD-10-CM

## 2020-06-29 DIAGNOSIS — Z30.41 ORAL CONTRACEPTIVE PILL SURVEILLANCE: ICD-10-CM

## 2020-06-29 DIAGNOSIS — Z01.419 WELL WOMAN EXAM WITH ROUTINE GYNECOLOGICAL EXAM: Primary | ICD-10-CM

## 2020-06-29 RX ORDER — DESOGESTREL AND ETHINYL ESTRADIOL 0.15-0.03
1 KIT ORAL DAILY
Qty: 3 DOSE PACK | Refills: 3 | Status: SHIPPED | OUTPATIENT
Start: 2020-06-29

## 2020-06-29 RX ORDER — ALBUTEROL SULFATE 90 UG/1
2 AEROSOL, METERED RESPIRATORY (INHALATION)
Qty: 1 INHALER | Refills: 1 | Status: SHIPPED | OUTPATIENT
Start: 2020-06-29

## 2020-06-29 NOTE — PROGRESS NOTES
ANNUAL PHYSICAL EXAMINATION    History of Present Illness  Ana Millard is a 36 y.o. female who presents today for management of    Chief Complaint   Patient presents with    Complete Physical    Medication Refill       Patient here for routine exam.  Current Complaints: none. Gynecologic History  Patient's last menstrual period was Patient's last menstrual period was 06/16/2020. Jean-Pierre Rider Contraception: OCP (Oral Contraceptive Pills)  Last Pap: 2017  Results: normal    Health Maintenance  Colon cancer: due at age 49  Dyslipidemia: due  Diabetes mellitus: due  Influenza vaccine: uptodate  Pneumococcal vaccine: not indicated  Tdap: declined  Herpes Zoster vaccine: due at age 62  Hep B vaccine: not indicated    Weight:  Body mass index is Estimated body mass index is Body mass index is 26.06 kg/m². Diet: vegetarian  Exercise: yes  Seatbelt: yes  Sunscreen: yes  Dentist: yes      Past Medical History  Past Medical History:   Diagnosis Date    Acne     Wheezing         Surgical History  Past Surgical History:   Procedure Laterality Date    HX TONSILLECTOMY      HX WISDOM TEETH EXTRACTION          Current Medications  Current Outpatient Medications   Medication Sig    desogestreL-ethinyl estradioL (Juleber) 0.15-0.03 mg tab Take 1 Tab by mouth daily.  albuterol (PROVENTIL HFA, VENTOLIN HFA, PROAIR HFA) 90 mcg/actuation inhaler Take 2 Puffs by inhalation every four (4) hours as needed for Wheezing.  tretinoin (RETIN-A) 0.01 % topical gel Apply  to affected area nightly. No current facility-administered medications for this visit.         Allergies/Drug Reactions  Allergies   Allergen Reactions    Percocet [Oxycodone-Acetaminophen] Nausea and Vomiting    Vicodin [Hydrocodone-Acetaminophen] Nausea and Vomiting        Family History  Family History   Problem Relation Age of Onset    Asthma Mother     Asthma Brother     Diabetes Father     Heart Attack Father 39    Alzheimer Father     Alzheimer Paternal Grandfather         Social History  Social History     Socioeconomic History    Marital status: SINGLE     Spouse name: Not on file    Number of children: Not on file    Years of education: Not on file    Highest education level: Not on file   Tobacco Use    Smoking status: Never Smoker    Smokeless tobacco: Never Used   Substance and Sexual Activity    Alcohol use: Yes     Alcohol/week: 4.0 standard drinks     Types: 4 Glasses of wine per week    Drug use: No    Sexual activity: Yes     Partners: Male     Birth control/protection: Pill   Social History Narrative    Works as a . Single        Health Maintenance   Topic Date Due    DTaP/Tdap/Td series (1 - Tdap) 09/25/2007    PAP AKA CERVICAL CYTOLOGY  08/08/2020    Influenza Age 5 to Adult  08/01/2020    Pneumococcal 0-64 years  Aged Out       There is no immunization history on file for this patient.     Review of Systems  General ROS: negative for - chills, fatigue or fever  Psychological ROS: negative  Ophthalmic ROS: negative  ENT ROS: negative  Allergy and Immunology ROS: negative  Hematological and Lymphatic ROS: negative  Endocrine ROS: negative  Breast ROS: negative for breast lumps  Respiratory ROS: no cough, shortness of breath, or wheezing  Cardiovascular ROS: no chest pain or dyspnea on exertion  Gastrointestinal ROS: no abdominal pain, change in bowel habits, or black or bloody stools  Genito-Urinary ROS: no dysuria, trouble voiding, or hematuria  Musculoskeletal ROS: negative  Neurological ROS: no TIA or stroke symptoms  Dermatological ROS: negative      No exam data present      Physical Exam  Vital signs:   Vitals:    06/29/20 1457   BP: 132/84   Pulse: (!) 103   Resp: 18   Temp: 97.1 °F (36.2 °C)   TempSrc: Oral   SpO2: 99%   Weight: 151 lb 12.8 oz (68.9 kg)   Height: 5' 4\" (1.626 m)       General: alert, oriented, not in distress  Head: scalp normal, atraumatic  Eyes: pupils are equal and reactive, full and intact EOM's  Ears: patent ear canal, intact tympanic membrane  Nose: normal turbinates, no congestion or discharge  Lips/Mouth: moist lips and buccal mucosa, non-enlarged tonsils, pink throat  Neck: supple, no JVD, no lymphadenopathy, non-palpable thyroid  Chest/Lungs: clear breath sounds, no wheezing or crackles  Breasts: right breast normal without mass, skin or nipple changes or axillary nodes, left breast normal without mass, skin or nipple changes or axillary nodes  Heart: normal rate, regular rhythm, no murmur  Abdomen: soft, non-distended, non-tender, normal bowel sounds, no organomegaly, no masses  Extremities: no focal deformities, no edema  Skin: no active skin lesions  Pelvic exam: VULVA: normal appearing vulva with no masses, tenderness or lesions, VAGINA: normal appearing vagina with normal color and discharge, no lesions, CERVIX: normal appearing cervix without discharge or lesions, URETHRAL MEATUS: no erythema or lesions present, PERINEUM: no evidence of trauma, no rashes or skin lesions present, ANUS: within normal limits, no hemorrhoids present    Assessment/Plan:        ICD-10-CM ICD-9-CM    1. Well woman exam with routine gynecological exam Z01.419 V72.31 CBC WITH AUTOMATED DIFF      HEMOGLOBIN A1C WITH EAG      LIPID PANEL      METABOLIC PANEL, COMPREHENSIVE      URINALYSIS W/ RFLX MICROSCOPIC      PAP IG, APTIMA HPV AND RFX 16/18,45 ([id number])   2. Oral contraceptive pill surveillance Z30.41 V25.41 AMB POC URINE PREGNANCY TEST, VISUAL COLOR COMPARISON      desogestreL-ethinyl estradioL (Juleber) 0.15-0.03 mg tab   3. Routine screening for STI (sexually transmitted infection) Z11.3 V74.5 HIV 1/2 AG/AB, 4TH GENERATION,W RFLX CONFIRM      CHLAMYDIA/NEISSERIA AMPLIFICATION   4.  Wheezing R06.2 786.07 albuterol (PROVENTIL HFA, VENTOLIN HFA, PROAIR HFA) 90 mcg/actuation inhaler       pap smear  counseled on breast self exam, STD prevention and use and side effects of OCP's      I have discussed the diagnosis with the patient and the intended plan as seen in the above orders. The patient has received an after-visit summary and questions were answered concerning future plans. I have discussed medication side effects and warnings with the patient as well. I have reviewed the plan of care with the patient, accepted their input and they are in agreement with the treatment goals.        Cam Spivey MD  June 29, 2020

## 2020-06-29 NOTE — PATIENT INSTRUCTIONS
Dr. Faby Ratliff [address] [employer],  [place] Phone: [phone] Dr. Dedra Cyr. [address] [facility], [address] 
[phone] Well Visit, Ages 25 to 48: Care Instructions Your Care Instructions Physical exams can help you stay healthy. Your doctor has checked your overall health and may have suggested ways to take good care of yourself. He or she also may have recommended tests. At home, you can help prevent illness with healthy eating, regular exercise, and other steps. Follow-up care is a key part of your treatment and safety. Be sure to make and go to all appointments, and call your doctor if you are having problems. It's also a good idea to know your test results and keep a list of the medicines you take. How can you care for yourself at home? · Reach and stay at a healthy weight. This will lower your risk for many problems, such as obesity, diabetes, heart disease, and high blood pressure. · Get at least 30 minutes of physical activity on most days of the week. Walking is a good choice. You also may want to do other activities, such as running, swimming, cycling, or playing tennis or team sports. Discuss any changes in your exercise program with your doctor. · Do not smoke or allow others to smoke around you. If you need help quitting, talk to your doctor about stop-smoking programs and medicines. These can increase your chances of quitting for good. · Talk to your doctor about whether you have any risk factors for sexually transmitted infections (STIs). Having one sex partner (who does not have STIs and does not have sex with anyone else) is a good way to avoid these infections. · Use birth control if you do not want to have children at this time. Talk with your doctor about the choices available and what might be best for you. · Protect your skin from too much sun. When you're outdoors from 10 a.m. to 4 p.m., stay in the shade or cover up with clothing and a hat with a wide brim. Wear sunglasses that block UV rays. Even when it's cloudy, put broad-spectrum sunscreen (SPF 30 or higher) on any exposed skin. · See a dentist one or two times a year for checkups and to have your teeth cleaned. · Wear a seat belt in the car. Follow your doctor's advice about when to have certain tests. These tests can spot problems early. For everyone · Cholesterol. Have the fat (cholesterol) in your blood tested after age 21. Your doctor will tell you how often to have this done based on your age, family history, or other things that can increase your risk for heart disease. · Blood pressure. Have your blood pressure checked during a routine doctor visit. Your doctor will tell you how often to check your blood pressure based on your age, your blood pressure results, and other factors. · Vision. Talk with your doctor about how often to have a glaucoma test. 
· Diabetes. Ask your doctor whether you should have tests for diabetes. · Colon cancer. Your risk for colorectal cancer gets higher as you get older. Some experts say that adults should start regular screening at age 48 and stop at age 76. Others say to start before age 48 or continue after age 76. Talk with your doctor about your risk and when to start and stop screening. For women · Breast exam and mammogram. Talk to your doctor about when you should have a clinical breast exam and a mammogram. Medical experts differ on whether and how often women under 50 should have these tests. Your doctor can help you decide what is right for you. · Cervical cancer screening test and pelvic exam. Begin with a Pap test at age 24. The test often is part of a pelvic exam. Starting at age 27, you may choose to have a Pap test, an HPV test, or both tests at the same time (called co-testing). Talk with your doctor about how often to have testing. · Tests for sexually transmitted infections (STIs). Ask whether you should have tests for STIs. You may be at risk if you have sex with more than one person, especially if your partners do not wear condoms. For men · Tests for sexually transmitted infections (STIs). Ask whether you should have tests for STIs. You may be at risk if you have sex with more than one person, especially if you do not wear a condom. · Testicular cancer exam. Ask your doctor whether you should check your testicles regularly. · Prostate exam. Talk to your doctor about whether you should have a blood test (called a PSA test) for prostate cancer. Experts differ on whether and when men should have this test. Some experts suggest it if you are older than 39 and are -American or have a father or brother who got prostate cancer when he was younger than 72. When should you call for help? Watch closely for changes in your health, and be sure to contact your doctor if you have any problems or symptoms that concern you. Where can you learn more? Go to http://alma-true.info/ Enter P072 in the search box to learn more about \"Well Visit, Ages 25 to 48: Care Instructions. \" Current as of: August 22, 2019               Content Version: 12.5 © 7661-0853 [employer]. Care instructions adapted under license by [employer] (which disclaims liability or warranty for this information). If you have questions about a medical condition or this instruction, always ask your healthcare professional. [place] [address] any warranty or liability for your use of this information.

## 2020-07-02 ENCOUNTER — TELEPHONE (OUTPATIENT)
Dept: FAMILY MEDICINE CLINIC | Age: 35
End: 2020-07-02

## 2020-07-02 DIAGNOSIS — B37.31 CANDIDA VAGINITIS: ICD-10-CM

## 2020-07-02 DIAGNOSIS — R87.610 PAP SMEAR ABNORMALITY OF CERVIX WITH ASCUS FAVORING BENIGN: Primary | ICD-10-CM

## 2020-07-02 LAB — PAP IMAGE GUIDED, 8900296: ABNORMAL

## 2020-07-06 PROBLEM — R87.610 PAP SMEAR ABNORMALITY OF CERVIX WITH ASCUS FAVORING BENIGN: Status: ACTIVE | Noted: 2020-07-06

## 2020-07-06 RX ORDER — FLUCONAZOLE 150 MG/1
150 TABLET ORAL
Qty: 2 TAB | Refills: 0 | Status: SHIPPED | OUTPATIENT
Start: 2020-07-06 | End: 2020-07-10

## 2020-07-09 ENCOUNTER — DOCUMENTATION ONLY (OUTPATIENT)
Dept: FAMILY MEDICINE CLINIC | Age: 35
End: 2020-07-09

## 2020-07-09 NOTE — PROGRESS NOTES
Pt called in and informed me that she was transferring to a different Primary Care Physician and wanted to make us aware. Documentation only.

## 2020-07-22 LAB
MISCELLANEOUS TEST,99000: NORMAL
SENT TO, 434: NORMAL
TEST NAME, 435: NORMAL